# Patient Record
Sex: FEMALE | Race: WHITE | NOT HISPANIC OR LATINO | Employment: FULL TIME | URBAN - METROPOLITAN AREA
[De-identification: names, ages, dates, MRNs, and addresses within clinical notes are randomized per-mention and may not be internally consistent; named-entity substitution may affect disease eponyms.]

---

## 2022-12-21 ENCOUNTER — OFFICE VISIT (OUTPATIENT)
Dept: URGENT CARE | Facility: CLINIC | Age: 47
End: 2022-12-21
Payer: COMMERCIAL

## 2022-12-21 VITALS
SYSTOLIC BLOOD PRESSURE: 151 MMHG | HEART RATE: 112 BPM | DIASTOLIC BLOOD PRESSURE: 92 MMHG | WEIGHT: 145 LBS | OXYGEN SATURATION: 97 % | TEMPERATURE: 102 F | HEIGHT: 66 IN | RESPIRATION RATE: 18 BRPM | BODY MASS INDEX: 23.3 KG/M2

## 2022-12-21 DIAGNOSIS — J10.1 INFLUENZA A: Primary | ICD-10-CM

## 2022-12-21 DIAGNOSIS — R52 BODY ACHES: ICD-10-CM

## 2022-12-21 LAB
CTP QC/QA: YES
POC MOLECULAR INFLUENZA A AGN: POSITIVE
POC MOLECULAR INFLUENZA B AGN: NEGATIVE

## 2022-12-21 PROCEDURE — 3080F DIAST BP >= 90 MM HG: CPT | Mod: CPTII,S$GLB,,

## 2022-12-21 PROCEDURE — 1159F MED LIST DOCD IN RCRD: CPT | Mod: CPTII,S$GLB,,

## 2022-12-21 PROCEDURE — 3008F BODY MASS INDEX DOCD: CPT | Mod: CPTII,S$GLB,,

## 2022-12-21 PROCEDURE — 1160F RVW MEDS BY RX/DR IN RCRD: CPT | Mod: CPTII,S$GLB,,

## 2022-12-21 PROCEDURE — 87502 POCT INFLUENZA A/B MOLECULAR: ICD-10-PCS | Mod: QW,S$GLB,,

## 2022-12-21 PROCEDURE — 3080F PR MOST RECENT DIASTOLIC BLOOD PRESSURE >= 90 MM HG: ICD-10-PCS | Mod: CPTII,S$GLB,,

## 2022-12-21 PROCEDURE — 3008F PR BODY MASS INDEX (BMI) DOCUMENTED: ICD-10-PCS | Mod: CPTII,S$GLB,,

## 2022-12-21 PROCEDURE — 1159F PR MEDICATION LIST DOCUMENTED IN MEDICAL RECORD: ICD-10-PCS | Mod: CPTII,S$GLB,,

## 2022-12-21 PROCEDURE — 87502 INFLUENZA DNA AMP PROBE: CPT | Mod: QW,S$GLB,,

## 2022-12-21 PROCEDURE — 99204 PR OFFICE/OUTPT VISIT, NEW, LEVL IV, 45-59 MIN: ICD-10-PCS | Mod: S$GLB,,,

## 2022-12-21 PROCEDURE — 3077F SYST BP >= 140 MM HG: CPT | Mod: CPTII,S$GLB,,

## 2022-12-21 PROCEDURE — 99204 OFFICE O/P NEW MOD 45 MIN: CPT | Mod: S$GLB,,,

## 2022-12-21 PROCEDURE — 1160F PR REVIEW ALL MEDS BY PRESCRIBER/CLIN PHARMACIST DOCUMENTED: ICD-10-PCS | Mod: CPTII,S$GLB,,

## 2022-12-21 PROCEDURE — 3077F PR MOST RECENT SYSTOLIC BLOOD PRESSURE >= 140 MM HG: ICD-10-PCS | Mod: CPTII,S$GLB,,

## 2022-12-21 RX ORDER — DEXTROMETHORPHAN HBR, GUAIFENESIN AND PSEUDOEPHEDRINE HCL 60; 380; 20 MG/1; MG/1; MG/1
1 TABLET ORAL EVERY 6 HOURS PRN
Qty: 20 TABLET | Refills: 0 | Status: SHIPPED | OUTPATIENT
Start: 2022-12-21 | End: 2022-12-26

## 2022-12-21 RX ORDER — FLUTICASONE PROPIONATE 50 MCG
2 SPRAY, SUSPENSION (ML) NASAL DAILY
Qty: 9.9 ML | Refills: 0 | Status: SHIPPED | OUTPATIENT
Start: 2022-12-21

## 2022-12-22 NOTE — PROGRESS NOTES
"Subjective:       Patient ID: Akshat Maradiaga is a 47 y.o. female.    Vitals:  height is 5' 6" (1.676 m) and weight is 65.8 kg (145 lb). Her oral temperature is 101.8 °F (38.8 °C) (abnormal). Her blood pressure is 151/92 (abnormal) and her pulse is 112 (abnormal). Her respiration is 18 and oxygen saturation is 97%.     Chief Complaint: URI    Pt states that she took a at home covid test today at home negative. Patient is a  and students have the flu. Stated she has not has a fever that she knows of.     URI   This is a new problem. Episode onset: 3 days. The problem has been gradually worsening. There has been no fever. Associated symptoms include congestion, coughing (dry), ear pain (pressure), headaches and sneezing. Pertinent negatives include no diarrhea, nausea, rhinorrhea or vomiting. Associated symptoms comments: Body aches   . Treatments tried: Boiron Oscillococcinum. The treatment provided no relief.     Constitution: Positive for chills.        +bodyache   HENT:  Positive for ear pain (pressure), congestion and postnasal drip.    Respiratory:  Positive for cough (dry).    Gastrointestinal:  Negative for nausea, vomiting and diarrhea.   Allergic/Immunologic: Positive for sneezing.   Neurological:  Positive for headaches.     Objective:      Physical Exam   Constitutional: She is oriented to person, place, and time. She appears well-developed. She is cooperative.  Non-toxic appearance. She does not appear ill. No distress.   HENT:   Head: Normocephalic and atraumatic.   Ears:   Right Ear: Hearing, tympanic membrane, external ear and ear canal normal.   Left Ear: Hearing, tympanic membrane, external ear and ear canal normal.   Nose: Congestion present. No mucosal edema, rhinorrhea or nasal deformity. No epistaxis. Right sinus exhibits no maxillary sinus tenderness and no frontal sinus tenderness. Left sinus exhibits no maxillary sinus tenderness and no frontal sinus tenderness.   Mouth/Throat: Uvula " is midline, oropharynx is clear and moist and mucous membranes are normal. No trismus in the jaw. Normal dentition. No uvula swelling. No oropharyngeal exudate, posterior oropharyngeal edema or posterior oropharyngeal erythema.   Eyes: Conjunctivae and lids are normal. No scleral icterus.   Neck: Trachea normal and phonation normal. Neck supple. No edema present. No erythema present. No neck rigidity present.   Cardiovascular: Normal rate, regular rhythm, normal heart sounds and normal pulses.   Pulmonary/Chest: Effort normal and breath sounds normal. No respiratory distress. She has no decreased breath sounds. She has no wheezes. She has no rhonchi.   Abdominal: Normal appearance. Soft.   Musculoskeletal: Normal range of motion.         General: No deformity. Normal range of motion.   Neurological: She is alert and oriented to person, place, and time. She exhibits normal muscle tone. Coordination normal.   Skin: Skin is warm, dry, intact, not diaphoretic and not pale.   Psychiatric: Her speech is normal and behavior is normal. Judgment and thought content normal.   Nursing note and vitals reviewed.      Results for orders placed or performed in visit on 12/21/22   POCT Influenza A/B MOLECULAR   Result Value Ref Range    POC Molecular Influenza A Ag Positive (A) Negative, Not Reported    POC Molecular Influenza B Ag Negative Negative, Not Reported     Acceptable Yes        Assessment:       1. Influenza A    2. Body aches          Plan:         Influenza A  -     pseudoephedrine-DM-guaiFENesin (POLY-VENT DM) 60- mg Tab; Take 1 tablet by mouth every 6 (six) hours as needed (congestion).  Dispense: 20 tablet; Refill: 0  -     fluticasone propionate (FLONASE) 50 mcg/actuation nasal spray; 2 sprays (100 mcg total) by Each Nostril route once daily.  Dispense: 9.9 mL; Refill: 0    Body aches  -     POCT Influenza A/B MOLECULAR       Declined covid swab to be done in clinic today.      Please drink  plenty of fluids. Please get plenty of rest.  Please return here or go to the Emergency Department for any concerns or worsening of condition.  If you do not have Hypertension or any history of palpitations, it is ok to take over the counter Sudafed or Mucinex D or Allegra-D or Claritin-D or Zyrtec-D.  If you do take one of the above, it is ok to combine that with plain over the counter Mucinex or Allegra or Claritin or Zyrtec.  If for example you are taking Zyrtec -D, you can combine that with Mucinex, but not Mucinex-D.  If you are taking Mucinex-D, you can combine that with plain Allegra or Claritin or Zyrtec.   If you do have Hypertension or palpitations, it is safe to take Coricidin HBP for relief of sinus symptoms.  If not allergic, please take over the counter Tylenol (Acetaminophen) and/or Motrin (Ibuprofen) as directed for control of pain and/or fever.  Please follow up with your primary care doctor or specialist as needed.  If you  smoke, please stop smoking.     Discussed with patient the importance of f/u with their primary care provider. Urged to go to the ER for any worsening signs or symptoms.

## 2025-02-10 ENCOUNTER — OFFICE VISIT (OUTPATIENT)
Dept: URGENT CARE | Facility: CLINIC | Age: 50
End: 2025-02-10
Payer: COMMERCIAL

## 2025-02-10 VITALS
WEIGHT: 145.5 LBS | RESPIRATION RATE: 19 BRPM | TEMPERATURE: 98 F | HEIGHT: 66 IN | HEART RATE: 75 BPM | BODY MASS INDEX: 23.38 KG/M2 | OXYGEN SATURATION: 95 % | DIASTOLIC BLOOD PRESSURE: 87 MMHG | SYSTOLIC BLOOD PRESSURE: 131 MMHG

## 2025-02-10 DIAGNOSIS — J01.90 ACUTE BACTERIAL SINUSITIS: Primary | ICD-10-CM

## 2025-02-10 DIAGNOSIS — B96.89 ACUTE BACTERIAL SINUSITIS: Primary | ICD-10-CM

## 2025-02-10 PROCEDURE — 99213 OFFICE O/P EST LOW 20 MIN: CPT | Mod: S$GLB,,,

## 2025-02-10 RX ORDER — AMOXICILLIN AND CLAVULANATE POTASSIUM 875; 125 MG/1; MG/1
1 TABLET, FILM COATED ORAL EVERY 12 HOURS
Qty: 20 TABLET | Refills: 0 | Status: SHIPPED | OUTPATIENT
Start: 2025-02-10 | End: 2025-02-20

## 2025-02-10 NOTE — PROGRESS NOTES
"Subjective:      Patient ID: Akshat Maradiaga is a 49 y.o. female.    Vitals:  height is 5' 6" (1.676 m) and weight is 66 kg (145 lb 8.1 oz). Her oral temperature is 97.9 °F (36.6 °C). Her blood pressure is 131/87 and her pulse is 75. Her respiration is 19 and oxygen saturation is 95%.     Chief Complaint: Nasal Congestion    Pt states that she has been having a post nasal drip, sinus congestion and sore throat. Pt states that her symptoms started 2 weeks ago.Pt has been taking otc meds for symptoms with mild relief.    Sore Throat   This is a new problem. Episode onset: 2 weeks ago. The problem has been unchanged. Neither side of throat is experiencing more pain than the other. There has been no fever. The pain is at a severity of 0/10. The patient is experiencing no pain. Associated symptoms include congestion. Pertinent negatives include no diarrhea, headaches, hoarse voice, swollen glands or trouble swallowing. She has had no exposure to strep or mono. Treatments tried: otc meds. The treatment provided mild relief.       HENT:  Positive for congestion and sore throat. Negative for trouble swallowing.    Gastrointestinal:  Negative for diarrhea.   Neurological:  Negative for headaches.      Objective:     Physical Exam   Constitutional:  Non-toxic appearance. She does not appear ill. No distress.   HENT:   Head: Normocephalic and atraumatic.   Ears:   Right Ear: Tympanic membrane, external ear and ear canal normal.   Left Ear: Tympanic membrane, external ear and ear canal normal.   Nose: Congestion present. No rhinorrhea. Right sinus exhibits maxillary sinus tenderness and frontal sinus tenderness. Left sinus exhibits maxillary sinus tenderness and frontal sinus tenderness.   Mouth/Throat: Uvula is midline and oropharynx is clear and moist. Mucous membranes are moist. No posterior oropharyngeal edema or posterior oropharyngeal erythema. Oropharynx is clear.   Eyes: Conjunctivae are normal.   Neck: Neck supple. No neck " rigidity present.   Cardiovascular: Normal rate and regular rhythm.   Pulmonary/Chest: Effort normal. No respiratory distress. She has no wheezes. She has no rhonchi.   Abdominal: Normal appearance.   Neurological: no focal deficit. She is alert.   Skin: Skin is warm, dry and not diaphoretic.   Psychiatric: Her behavior is normal. Judgment and thought content normal.   Nursing note and vitals reviewed.      Assessment:     1. Acute bacterial sinusitis        Plan:       Acute bacterial sinusitis  -     amoxicillin-clavulanate 875-125mg (AUGMENTIN) 875-125 mg per tablet; Take 1 tablet by mouth every 12 (twelve) hours. for 10 days  Dispense: 20 tablet; Refill: 0        VS stable, afebrile.   Please drink plenty of fluids.  Please get plenty of rest.  Please return here or go to the Emergency Department for any concerns or worsening of condition.  If you were prescribed antibiotics, please take them to completion.  If you do not have Hypertension or any history of palpitations, it is ok to take over the counter Sudafed or Mucinex D or Allegra-D or Claritin-D or Zyrtec-D.  If you do take one of the above, it is ok to combine that with plain over the counter Mucinex or Allegra or Claritin or Zyrtec.  If for example you are taking Zyrtec -D, you can combine that with Mucinex, but not Mucinex-D.  If you are taking Mucinex-D, you can combine that with plain Allegra or Claritin or Zyrtec.   If you do have Hypertension or palpitations, it is safe to take Coricidin HBP for relief of sinus symptoms.  If not allergic, please take over the counter Tylenol (Acetaminophen) and/or Motrin (Ibuprofen) as directed for control of pain and/or fever.  Please follow up with your primary care doctor or specialist as needed.    If you  smoke, please stop smoking.  Patient Instructions   1.  Take all medications as directed. If you have been prescribed antibiotics, make sure to complete them.   2.  Rest and keep yourself/patient well  hydrated. For adults, it is recommended to drink at least 8-10 glasses of water daily.   3.  For patients above 6 months of age who are not allergic to and are not on anticoagulants, you can alternate Tylenol and Motrin every 4-6 hours for fever above 100.4F and/or pain.  For patients less than 6 months of age, allergic to or intolerant to NSAIDS, have gastritis, gastric ulcers, or history of GI bleeds, are pregnant, or are on anticoagulant therapy, you can take Tylenol every 4 hours as needed for fever above 100.4F and/or pain.   4. You should schedule a follow-up appointment with your Primary Care Provider/Pediatrician for recheck in 2-3 days or as directed at this visit.   5.  If your condition fails to improve in a timely manner, you should receive another evaluation by your Primary Care Provider/Pediatrician to discuss your concerns or return to urgent care for a recheck.  If your condition worsens at any time, you should report immediately to your nearest Emergency Department for further evaluation. **You must understand that you have received Urgent Care treatment only and that you may be released before all of your medical problems are known or treated. You, the patient, are responsible to arrange for follow-up care as instructed.     Medical Decision Making:   Urgent Care Management:  Pt declined covid/flu testing in clinic. Patient presenting with purulent nasal discharge, sinus pressure with associated post nasal drainage . Worsening symptoms after an initial period of improvement. Patient does not have symptoms of shortness of breath, nausea, vomiting, fever. Treatment prior to arrival includes OTC cough medications with no significant improvement. No evidence of other bacterial infections including pneumonia or meningitis.  Due to type and duration, worsening of symptoms and exam findings, will treat as bacterial sinusitis. Advised patient on supportive therapies.

## 2025-06-04 ENCOUNTER — CLINICAL SUPPORT (OUTPATIENT)
Dept: REHABILITATION | Facility: HOSPITAL | Age: 50
End: 2025-06-04
Payer: COMMERCIAL

## 2025-06-04 DIAGNOSIS — R53.1 WEAKNESS: ICD-10-CM

## 2025-06-04 DIAGNOSIS — R27.8 COORDINATION ABNORMAL: Primary | ICD-10-CM

## 2025-06-04 PROCEDURE — 97162 PT EVAL MOD COMPLEX 30 MIN: CPT | Mod: PN | Performed by: PHYSICAL THERAPIST

## 2025-06-04 PROCEDURE — 97530 THERAPEUTIC ACTIVITIES: CPT | Mod: PN | Performed by: PHYSICAL THERAPIST

## 2025-06-12 ENCOUNTER — CLINICAL SUPPORT (OUTPATIENT)
Dept: REHABILITATION | Facility: HOSPITAL | Age: 50
End: 2025-06-12
Payer: COMMERCIAL

## 2025-06-12 DIAGNOSIS — R53.1 WEAKNESS: ICD-10-CM

## 2025-06-12 DIAGNOSIS — R27.8 COORDINATION ABNORMAL: Primary | ICD-10-CM

## 2025-06-12 PROCEDURE — 97530 THERAPEUTIC ACTIVITIES: CPT | Mod: PN | Performed by: PHYSICAL THERAPIST

## 2025-06-12 PROCEDURE — 97140 MANUAL THERAPY 1/> REGIONS: CPT | Mod: PN | Performed by: PHYSICAL THERAPIST

## 2025-06-12 PROCEDURE — 97112 NEUROMUSCULAR REEDUCATION: CPT | Mod: PN | Performed by: PHYSICAL THERAPIST

## 2025-06-12 NOTE — PROGRESS NOTES
Outpatient Rehab    Physical Therapy Visit    Patient Name: Akshat Maradiaga  MRN: [id number]  YOB: 1975  Encounter Date: 6/12/2025    Therapy Diagnosis:   Encounter Diagnoses   Name Primary?    Coordination abnormal Yes    Weakness      Physician: Self, Aaareferral    Physician Orders: Eval and Treat  Medical Diagnosis:   Surgical Diagnosis: Not applicable for this Episode   Surgical Date: Not applicable for this Episode    Visit # 2/12 Visits Authorized:  1 / 12  Insurance Authorization Period: 6/12/2025 to 9/30/2025  Date of Evaluation: 6/4/2025  Plan of Care Certification: 6/4/2025 to 8/27/2025      PT/PTA:     Number of PTA visits since last PT visit:   Time In: 1400   Time Out:    Total Time (in minutes):     Total Billable Time (in minutes):      FOTO:  Intake Score:  %  Survey Score 2:  %  Survey Score 3:  %    Precautions:       Subjective   No new complaints. States that it wraps around her pelvis at times. Had bloodwork yesterday with Dr. Caputo..  Pain reported as 2/10. Back pain    Objective   Pelvic External Observations  Consent for Examination: Yes, Chaperone Present: No    Pelvic Assessment  Perineal Skin Quality: Other (Comment)  Other Perineal Skin Quality: vaginal fullness noted  Perineal Body Resting Position: Normal  Perineal Descent Bearing Down: Absent  Volitional Contraction: Present  Volitional Relaxation: Absent  Volitional Bearing Down: Absent          Pelvic Floor Special Tests  Single Digit Intravaginal Assessment  Intravaginal Pelvic Floor Strength: 1  Intravaginal Pelvic Floor Relaxation Response: Absent  Anterior Vaginal Wall Laxity: present, but bladder is in good position on RUSI             Treatment:  Manual Therapy  MT 1: Vaginal exam - see note  Balance/Neuromuscular Re-Education  NMR 1: Perineal RUSI with emphysis on pf mobility and awareness.  Therapeutic Activity  TA 2: Educated on benefits of vaginal estrogen and to discuss further with her providers  TA 4:  Educated on hip ROM stretches and diphragmatic breathing to assist with pf tension.    Time Entry(in minutes):  Manual Therapy Time Entry: 10  Neuromuscular Re-Education Time Entry: 20  Therapeutic Activity Time Entry: 20    Assessment & Plan   Assessment: Pt with good understanding of PT recs.       The patient will continue to benefit from skilled outpatient physical therapy in order to address the deficits listed in the problem list on the initial evaluation, provide patient and family education, and maximize the patients level of independence in the home and community environments.     The patient's spiritual, cultural, and educational needs were considered, and the patient is agreeable to the plan of care and goals.           Plan: Perineal RUSI, monitor pf mobility and pelvic pain    Goals:   Active       Long term goals:       Patient will deny UI to allow for full activities of choice.        Start:  06/04/25    Expected End:  08/27/25            Patient will be independent with home exercise program progression.        Start:  06/04/25    Expected End:  08/27/25            Patient will be independent with management of intra-abdominal pressure.        Start:  06/04/25    Expected End:  08/27/25               Short term goals:       Pt to report urinary frequency of no more than every 2 hours to demonstrate improved bladder control         Start:  06/04/25    Expected End:  08/27/25            Pt to demonstrate box lift from floor using appropriate pressure management independently         Start:  06/04/25    Expected End:  08/27/25            Patient will be independent with proper TA contraction.        Start:  06/04/25    Expected End:  08/27/25                Robert Macedo, PT

## 2025-06-12 NOTE — PATIENT INSTRUCTIONS
Ask MD about vaginal estrogen to assist with vaginal dryness and bladder control - better tissue bulk makes it easier to hold back urine. Can also assist with preventing UTIs.       DIAPHRAGMATIC BREATHING     The diaphragm is a dome shaped muscle that forms the floor of the rib cage. It is the most efficient muscle for breathing and relaxation, although most people are not used to using the diaphragm. Diaphragmatic or belly breathing is an important technique to learn because it helps settle down or relax the autonomic nervous system. The correct use of diaphragmatic breathing can help to quiet brain activity resulting in the relaxation of all the muscles and organs of the body. This is accomplished by slow rhythmic breathing concentrated in the diaphragm muscle rather than the chest.    How to do proper relaxation breathing:    Start by lying on your back or reclining in a chair in a relaxed position. Place one hand on your chest and the other on your abdomen.  Relax your jaw by placing your tongue on the floor of your mouth and keeping your teeth slightly apart.   Take a deep breath in, letting the abdomen expand and rise while you keep your upper chest, neck and shoulders relaxed.   As you breathe out, allow your abdomen and chest to fall. Exhale completely.  It doesn't matter if you breathe in/out through your nose and/or mouth. Do whichever feels comfortable.  Remember to breathe slowly.  Do not force your breathing. Do not hold your breath.  Repeat for 5 minutes every day.  Alicia belly          Pelvic floor relaxation: gentle feeling of down and out   - occurs when urination, bowel movement, vaginal penetration, or passing gas  - every hour - tune into your pelvic floor and relax   - do not hold tension in the hips  - with breathing - let the diaphragm descend and the pelvic floor descend

## 2025-06-26 ENCOUNTER — CLINICAL SUPPORT (OUTPATIENT)
Dept: REHABILITATION | Facility: HOSPITAL | Age: 50
End: 2025-06-26
Payer: COMMERCIAL

## 2025-06-26 DIAGNOSIS — R53.1 WEAKNESS: ICD-10-CM

## 2025-06-26 DIAGNOSIS — R27.8 COORDINATION ABNORMAL: Primary | ICD-10-CM

## 2025-06-26 PROCEDURE — 97530 THERAPEUTIC ACTIVITIES: CPT | Mod: PN | Performed by: PHYSICAL THERAPIST

## 2025-06-26 PROCEDURE — 97112 NEUROMUSCULAR REEDUCATION: CPT | Mod: PN | Performed by: PHYSICAL THERAPIST

## 2025-06-26 PROCEDURE — 97140 MANUAL THERAPY 1/> REGIONS: CPT | Mod: PN | Performed by: PHYSICAL THERAPIST

## 2025-06-26 NOTE — PROGRESS NOTES
Outpatient Rehab    Physical Therapy Visit    Patient Name: Akshat Maradiaga  MRN: [id number]  YOB: 1975  Encounter Date: 6/26/2025    Therapy Diagnosis:   Encounter Diagnoses   Name Primary?    Coordination abnormal Yes    Weakness      Physician: Self, Aaareferral    Physician Orders: Eval and Treat  Medical Diagnosis:   Surgical Diagnosis: Not applicable for this Episode   Surgical Date: Not applicable for this Episode  Days Since Last Surgery: Not applicable for this Episode    Visit # / Visits Authorized:  2 / 12  Insurance Authorization Period: 6/12/2025 to 9/30/2025  Date of Evaluation: 6/4/2025  Plan of Care Certification: 6/4/2025 to 8/27/2025      PT/PTA:     Number of PTA visits since last PT visit:   Time In: 0718   Time Out: 0817  Total Time (in minutes): 59   Total Billable Time (in minutes):  59    FOTO:  Intake Score:  %  Survey Score 2:  %  Survey Score 3:  %    Precautions:       Subjective   Still with pain that wraps around her pelvis. It occurs anytime, but worse before her period. On her period today deferred internal pf therapy..  Pain reported as 3/10.      Objective            Treatment:  Manual Therapy  MT 1: TP release to the chi LA  MT 2: Passive chi hip IR/ER, hip extension, distraction  MT 3: Chi SKTC, chi piriformis stretch  Balance/Neuromuscular Re-Education  NMR 2: RUSI to the core with emphysis on proper TA activation and management of IAP - rib flare noted greater on her left, over use of the obliques noted as well  NMR 3: Educated on proper TA activaiton and awareness  Therapeutic Activity  TA 5: Provided HEP for hip extension  TA 6: Encouraged walking and avoiding TM on incline, stair climber, and stationary bike    Time Entry(in minutes):  Manual Therapy Time Entry: 15  Neuromuscular Re-Education Time Entry: 14  Therapeutic Activity Time Entry: 30    Assessment & Plan   Assessment: Pt with good understanding of PT recs. Improved core awareness. Benefiting from rib  approximation to assist with TA contraction.        The patient will continue to benefit from skilled outpatient physical therapy in order to address the deficits listed in the problem list on the initial evaluation, provide patient and family education, and maximize the patients level of independence in the home and community environments.     The patient's spiritual, cultural, and educational needs were considered, and the patient is agreeable to the plan of care and goals.           Plan: Perineal RUSI, monitor pf mobility and pelvic pain    Goals:   Active       Long term goals:       Patient will deny UI to allow for full activities of choice.        Start:  06/04/25    Expected End:  08/27/25            Patient will be independent with home exercise program progression.        Start:  06/04/25    Expected End:  08/27/25            Patient will be independent with management of intra-abdominal pressure.        Start:  06/04/25    Expected End:  08/27/25               Short term goals:       Pt to report urinary frequency of no more than every 2 hours to demonstrate improved bladder control         Start:  06/04/25    Expected End:  08/27/25            Pt to demonstrate box lift from floor using appropriate pressure management independently         Start:  06/04/25    Expected End:  08/27/25            Patient will be independent with proper TA contraction.        Start:  06/04/25    Expected End:  08/27/25                Robert Macedo, PT

## 2025-06-26 NOTE — PATIENT INSTRUCTIONS
Core: Use a towel to assist with pushing the ribs inward     Core:  Abdominal strength:   - inhale - open the lower ribs  - exhale - draw the lower ribs inward  - do not hold your breath - no one should know what you are doing   - 3x10, 10 sec hold   - you may feel the pelvic floor contract as well   - use this with function  - close your box before: lifting, pushing, pulling, coughing, getting in/out of the bed, getting in/out of the car  - like you are zipping a tight pair of pants  - shrink wrap the intestines